# Patient Record
Sex: MALE | Race: ASIAN | NOT HISPANIC OR LATINO | URBAN - METROPOLITAN AREA
[De-identification: names, ages, dates, MRNs, and addresses within clinical notes are randomized per-mention and may not be internally consistent; named-entity substitution may affect disease eponyms.]

---

## 2019-08-23 ENCOUNTER — EMERGENCY (EMERGENCY)
Facility: HOSPITAL | Age: 59
LOS: 1 days | Discharge: HOME | End: 2019-08-23
Admitting: EMERGENCY MEDICINE
Payer: COMMERCIAL

## 2019-08-23 VITALS
DIASTOLIC BLOOD PRESSURE: 76 MMHG | HEART RATE: 87 BPM | SYSTOLIC BLOOD PRESSURE: 145 MMHG | TEMPERATURE: 98 F | RESPIRATION RATE: 18 BRPM | OXYGEN SATURATION: 100 %

## 2019-08-23 VITALS
SYSTOLIC BLOOD PRESSURE: 125 MMHG | DIASTOLIC BLOOD PRESSURE: 74 MMHG | TEMPERATURE: 98 F | RESPIRATION RATE: 18 BRPM | OXYGEN SATURATION: 100 % | HEART RATE: 72 BPM

## 2019-08-23 PROCEDURE — 73030 X-RAY EXAM OF SHOULDER: CPT | Mod: 26,LT

## 2019-08-23 PROCEDURE — 72100 X-RAY EXAM L-S SPINE 2/3 VWS: CPT | Mod: 26

## 2019-08-23 PROCEDURE — 72125 CT NECK SPINE W/O DYE: CPT | Mod: 26

## 2019-08-23 PROCEDURE — 73562 X-RAY EXAM OF KNEE 3: CPT | Mod: 26,50

## 2019-08-23 PROCEDURE — 73080 X-RAY EXAM OF ELBOW: CPT | Mod: 26,LT

## 2019-08-23 PROCEDURE — 71250 CT THORAX DX C-: CPT | Mod: 26

## 2019-08-23 PROCEDURE — 99284 EMERGENCY DEPT VISIT MOD MDM: CPT

## 2019-08-23 PROCEDURE — 72070 X-RAY EXAM THORAC SPINE 2VWS: CPT | Mod: 26

## 2019-08-23 PROCEDURE — 72170 X-RAY EXAM OF PELVIS: CPT | Mod: 26

## 2019-08-23 RX ORDER — OXYCODONE AND ACETAMINOPHEN 5; 325 MG/1; MG/1
1 TABLET ORAL ONCE
Refills: 0 | Status: DISCONTINUED | OUTPATIENT
Start: 2019-08-23 | End: 2019-08-23

## 2019-08-23 RX ORDER — IBUPROFEN 200 MG
800 TABLET ORAL ONCE
Refills: 0 | Status: COMPLETED | OUTPATIENT
Start: 2019-08-23 | End: 2019-08-23

## 2019-08-23 RX ORDER — ACETAMINOPHEN 500 MG
650 TABLET ORAL ONCE
Refills: 0 | Status: COMPLETED | OUTPATIENT
Start: 2019-08-23 | End: 2019-08-23

## 2019-08-23 RX ORDER — DIAZEPAM 5 MG
1 TABLET ORAL
Qty: 3 | Refills: 0
Start: 2019-08-23 | End: 2019-08-25

## 2019-08-23 RX ORDER — OXYCODONE HYDROCHLORIDE 5 MG/1
10 TABLET ORAL ONCE
Refills: 0 | Status: DISCONTINUED | OUTPATIENT
Start: 2019-08-23 | End: 2019-08-23

## 2019-08-23 RX ADMIN — Medication 800 MILLIGRAM(S): at 14:42

## 2019-08-23 RX ADMIN — OXYCODONE HYDROCHLORIDE 10 MILLIGRAM(S): 5 TABLET ORAL at 16:30

## 2019-08-23 RX ADMIN — OXYCODONE AND ACETAMINOPHEN 1 TABLET(S): 5; 325 TABLET ORAL at 14:42

## 2019-08-23 RX ADMIN — Medication 650 MILLIGRAM(S): at 14:42

## 2019-08-23 NOTE — ED PROVIDER NOTE - NSFOLLOWUPINSTRUCTIONS_ED_ALL_ED_FT
Follow up with your primary doctor and an orthopedist. If you do not have an orthopedist call  to schedule an appointment. Take Naproxen every 12 hours for pain and valium before bed, DO NOT combine naproxen with motrin or ibuprofen and DO NOT drive or use heavy machinery after taking valium. Advance activity as tolerated.  Continue all previously prescribed medications as directed.  Follow up with your primary care physician in 48-72 hours- bring copies of your results.  Return to the ER for worsening or persistent symptoms, and/or ANY NEW OR CONCERNING SYMPTOMS. If you have issues obtaining follow up, please call: [phone] to obtain a doctor or specialist who takes your insurance in your area.  You may call [phone] to make an appointment with the internal medicine clinic.

## 2019-08-23 NOTE — ED PROVIDER NOTE - PROGRESS NOTE DETAILS
JEROME Briceno: Pt reassessed and notes greatly improved pain, all imaging normal, suspect msk ediology/contusion, will give ortho F/U if pain persists. Dr. Rivera: As per hospital protocol, this patient was managed by the PA exclusively and I am signing as a matter of protocol.  I did not participate in the care of this patient, nor was I aware of the evaluation or plan until the time this chart was signed.
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
## 2019-08-23 NOTE — ED PROVIDER NOTE - CONSTITUTIONAL, MLM
normal... Uncomfortable, well nourished, awake, alert, oriented to person, place, time/situation and in no apparent distress.

## 2019-08-23 NOTE — ED PROVIDER NOTE - CRANIAL NERVE AND PUPILLARY EXAM
5/5 strength and intact sensation bilaterally in upper and lower extremity/cranial nerves 2-12 intact

## 2019-10-14 ENCOUNTER — APPOINTMENT (OUTPATIENT)
Dept: MRI IMAGING | Facility: IMAGING CENTER | Age: 59
End: 2019-10-14
Payer: COMMERCIAL

## 2019-10-14 ENCOUNTER — OUTPATIENT (OUTPATIENT)
Dept: OUTPATIENT SERVICES | Facility: HOSPITAL | Age: 59
LOS: 1 days | End: 2019-10-14
Payer: COMMERCIAL

## 2019-10-14 DIAGNOSIS — Z00.8 ENCOUNTER FOR OTHER GENERAL EXAMINATION: ICD-10-CM

## 2019-10-14 PROCEDURE — 72148 MRI LUMBAR SPINE W/O DYE: CPT | Mod: 26

## 2019-10-14 PROCEDURE — 72148 MRI LUMBAR SPINE W/O DYE: CPT

## 2025-01-23 ENCOUNTER — EMERGENCY (EMERGENCY)
Facility: HOSPITAL | Age: 65
LOS: 1 days | Discharge: HOME | End: 2025-01-23
Attending: EMERGENCY MEDICINE
Payer: MEDICARE

## 2025-01-23 VITALS — OXYGEN SATURATION: 97 % | HEART RATE: 87 BPM | DIASTOLIC BLOOD PRESSURE: 76 MMHG | SYSTOLIC BLOOD PRESSURE: 159 MMHG

## 2025-01-23 VITALS
DIASTOLIC BLOOD PRESSURE: 74 MMHG | RESPIRATION RATE: 16 BRPM | SYSTOLIC BLOOD PRESSURE: 160 MMHG | TEMPERATURE: 98 F | WEIGHT: 149.91 LBS | HEIGHT: 65 IN | OXYGEN SATURATION: 96 % | HEART RATE: 88 BPM

## 2025-01-23 PROCEDURE — 71100 X-RAY EXAM RIBS UNI 2 VIEWS: CPT | Mod: 26

## 2025-01-23 PROCEDURE — 71046 X-RAY EXAM CHEST 2 VIEWS: CPT

## 2025-01-23 PROCEDURE — 99284 EMERGENCY DEPT VISIT MOD MDM: CPT

## 2025-01-23 PROCEDURE — 71046 X-RAY EXAM CHEST 2 VIEWS: CPT | Mod: 26

## 2025-01-23 PROCEDURE — 73080 X-RAY EXAM OF ELBOW: CPT

## 2025-01-23 PROCEDURE — 71100 X-RAY EXAM RIBS UNI 2 VIEWS: CPT

## 2025-01-23 PROCEDURE — 99284 EMERGENCY DEPT VISIT MOD MDM: CPT | Mod: 25

## 2025-01-23 PROCEDURE — 73080 X-RAY EXAM OF ELBOW: CPT | Mod: 26,LT

## 2025-01-23 RX ORDER — LIDOCAINE 50 MG/G
1 OINTMENT TOPICAL ONCE
Refills: 0 | Status: COMPLETED | OUTPATIENT
Start: 2025-01-23 | End: 2025-01-23

## 2025-01-23 RX ORDER — IBUPROFEN 200 MG
400 TABLET ORAL ONCE
Refills: 0 | Status: COMPLETED | OUTPATIENT
Start: 2025-01-23 | End: 2025-01-23

## 2025-01-23 RX ORDER — ACETAMINOPHEN 80 MG/.8ML
975 SOLUTION/ DROPS ORAL ONCE
Refills: 0 | Status: COMPLETED | OUTPATIENT
Start: 2025-01-23 | End: 2025-01-23

## 2025-01-23 RX ADMIN — LIDOCAINE 1 PATCH: 50 OINTMENT TOPICAL at 17:49

## 2025-01-23 RX ADMIN — ACETAMINOPHEN 975 MILLIGRAM(S): 80 SOLUTION/ DROPS ORAL at 17:48

## 2025-01-23 RX ADMIN — Medication 400 MILLIGRAM(S): at 17:48

## 2025-01-23 NOTE — ED PROVIDER NOTE - OBJECTIVE STATEMENT
01-Jun-2024 22:17
64-year-old male with past medical history of hypertension, presenting after a fall.  Patient states that he slipped on the last step because of his AC.  Patient fell backwards onto concrete.  Patient reporting right-sided posterior lower chest/abdominal pain.  Denies head trauma, LOC, nausea, vomiting, numbness/weakness of the arms/legs, saddle anesthesia.  Patient is not taking anything for the pain.  Denies blood thinners.

## 2025-01-23 NOTE — ED PROVIDER NOTE - NSFOLLOWUPINSTRUCTIONS_ED_ALL_ED_FT
You were seen in the Emergency Department for back pain and elbow pain.  Received x-rays to evaluate for rib fractures or out fracture of your elbow.  These x-rays did not find an acute fracture.    To control your pain at home, you should take Ibuprofen 400 mg along with Tylenol 650mg-1000mg every 6 to 8 hours. Limit your maximum daily Tylenol from all sources to 4000mg. Be aware that many other medications contain acetaminophen which is also known as Tylenol. Taking Tylenol and Ibuprofen together has been shown to be more effective at relieving pain than taking them separately. These are both over the counter medications that you can  at your local pharmacy without a prescription. You need to respect all of the warnings on the bottles. You shouldn’t take these medications for more than a week without following up with your doctor. Both medications come with certain risks and side effects that you need to discuss with your doctor, especially if you are taking them for a prolonged period.     1) Advance activity as tolerated.   2) Continue all previously prescribed medications as directed.    3) Follow up with your primary care physician within the week- take copies of your results.    4) Return to the Emergency Department for worsening or persistent symptoms, and/or ANY NEW OR CONCERNING SYMPTOMS.

## 2025-01-23 NOTE — ED ADULT NURSE NOTE - OBJECTIVE STATEMENT
64 yr old male with h/o high bp was in his back garden when he slippe and fell onto his back. was able to get up and walk but has some mid back pain. on assessment and o x 3 lungs clear abd soft non tender no swelling in extremities no n/v/d no fever no other complaints no loc no head strike.
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
## 2025-01-23 NOTE — ED PROVIDER NOTE - WR ORDER NAME 3
Abdomen soft, nontender, nondistended, bowel sounds present in all 4 quadrants.
Xray Elbow AP + Lateral + Oblique, Left

## 2025-01-23 NOTE — ED PROVIDER NOTE - CLINICAL SUMMARY MEDICAL DECISION MAKING FREE TEXT BOX
64-year-old male with past medical history of hypertension, presenting after a fall. Vitals nonactionable.  Physical exam with heart regular rate and rhythm, lungs clear to auscultation, abdomen soft nondistended and tender..  Patient with lower right-sided chest wall tenderness.  Concern for possible rib fractures.  Will obtain x-rays and administer pain medication.

## 2025-01-23 NOTE — ED PROVIDER NOTE - PROGRESS NOTE DETAILS
MD Juli (PGY-3) patient reassessed.  Patient reports improvement in back pain.  Patient x-rays reviewed.  No acute fractures.  Patient now reporting elbow pain of the left side for the past month.  Full range of motion of left elbow.  Mild tenderness over lateral epicondyle.  Will obtain x-ray of elbow.  Patient able to stably ambulate in the ED.  Likely dispo home. MD Juli (PGY-3) x-ray elbow performed.  No acute findings.  Will have patient follow-up with PCP concerning his pain.  Discussed with pt results of work up, strict return precautions, and need for follow up.  Pt expressed understanding and agrees with plan.

## 2025-01-23 NOTE — ED PROVIDER NOTE - PHYSICAL EXAMINATION
Const: not in acute distress  Eyes: no conjunctival injection  HEENT: Head NCAT, Moist MM.  Neck: Trachea midline.   CVS: +S1/S2, No murmurs or gallops  RESP: Unlabored respiratory effort. Clear to auscultation bilaterally.  Lower right-sided chest wall tenderness.  No ecchymosis noted.  GI: Nontender/Nondistended, No CVA tenderness b/l.   MSK: No midline spinal tenderness.  Skin: Intact.   Neuro: CN2-12 grossly intact. EOMI. 5/5 strength in UE and LE b/l.  Sensation intact in UE/LE b/l b/l.  Gait nl   Psych: Awake, Alert, & Cooperative

## 2025-01-23 NOTE — ED PROVIDER NOTE - PATIENT PORTAL LINK FT
You can access the FollowMyHealth Patient Portal offered by [facility] by registering at the following website: [email or web address]. By joining [facility]’s FollowMyHealth portal, you will also be able to view your health information using other applications (apps) compatible with our system.

## 2025-03-30 ENCOUNTER — EMERGENCY (EMERGENCY)
Facility: HOSPITAL | Age: 65
LOS: 1 days | Discharge: HOME | End: 2025-03-30
Attending: EMERGENCY MEDICINE | Admitting: STUDENT IN AN ORGANIZED HEALTH CARE EDUCATION/TRAINING PROGRAM
Payer: MEDICAID

## 2025-03-30 VITALS
SYSTOLIC BLOOD PRESSURE: 125 MMHG | WEIGHT: 149.91 LBS | OXYGEN SATURATION: 100 % | HEIGHT: 65 IN | HEART RATE: 64 BPM | RESPIRATION RATE: 16 BRPM | TEMPERATURE: 97 F | DIASTOLIC BLOOD PRESSURE: 83 MMHG

## 2025-03-30 VITALS
TEMPERATURE: 98 F | DIASTOLIC BLOOD PRESSURE: 74 MMHG | SYSTOLIC BLOOD PRESSURE: 127 MMHG | RESPIRATION RATE: 18 BRPM | HEART RATE: 78 BPM | OXYGEN SATURATION: 96 %

## 2025-03-30 DIAGNOSIS — I67.1 CEREBRAL ANEURYSM, NONRUPTURED: ICD-10-CM

## 2025-03-30 LAB
ADD ON TEST-SPECIMEN IN LAB: SIGNIFICANT CHANGE UP
ALBUMIN SERPL ELPH-MCNC: 4.2 G/DL — SIGNIFICANT CHANGE UP (ref 3.3–5)
ALP SERPL-CCNC: 107 U/L — SIGNIFICANT CHANGE UP (ref 40–120)
ALT FLD-CCNC: 13 U/L — SIGNIFICANT CHANGE UP (ref 4–41)
ANION GAP SERPL CALC-SCNC: 14 MMOL/L — SIGNIFICANT CHANGE UP (ref 7–14)
APTT BLD: 26.6 SEC — SIGNIFICANT CHANGE UP (ref 24.5–35.6)
AST SERPL-CCNC: 15 U/L — SIGNIFICANT CHANGE UP (ref 4–40)
BASOPHILS # BLD AUTO: 0.05 K/UL — SIGNIFICANT CHANGE UP (ref 0–0.2)
BASOPHILS NFR BLD AUTO: 0.6 % — SIGNIFICANT CHANGE UP (ref 0–2)
BILIRUB SERPL-MCNC: <0.2 MG/DL — SIGNIFICANT CHANGE UP (ref 0.2–1.2)
BUN SERPL-MCNC: 12 MG/DL — SIGNIFICANT CHANGE UP (ref 7–23)
CALCIUM SERPL-MCNC: 9.1 MG/DL — SIGNIFICANT CHANGE UP (ref 8.4–10.5)
CHLORIDE SERPL-SCNC: 105 MMOL/L — SIGNIFICANT CHANGE UP (ref 98–107)
CK MB BLD-MCNC: 1.9 % — SIGNIFICANT CHANGE UP (ref 0–2.5)
CK MB CFR SERPL CALC: 1.4 NG/ML — SIGNIFICANT CHANGE UP
CK SERPL-CCNC: 75 U/L — SIGNIFICANT CHANGE UP (ref 30–200)
CO2 SERPL-SCNC: 20 MMOL/L — LOW (ref 22–31)
CREAT SERPL-MCNC: 0.78 MG/DL — SIGNIFICANT CHANGE UP (ref 0.5–1.3)
EGFR: 98 ML/MIN/1.73M2 — SIGNIFICANT CHANGE UP
EGFR: 98 ML/MIN/1.73M2 — SIGNIFICANT CHANGE UP
EOSINOPHIL # BLD AUTO: 0.42 K/UL — SIGNIFICANT CHANGE UP (ref 0–0.5)
EOSINOPHIL NFR BLD AUTO: 4.7 % — SIGNIFICANT CHANGE UP (ref 0–6)
GLUCOSE SERPL-MCNC: 138 MG/DL — HIGH (ref 70–99)
HCT VFR BLD CALC: 39.6 % — SIGNIFICANT CHANGE UP (ref 39–50)
HGB BLD-MCNC: 13.5 G/DL — SIGNIFICANT CHANGE UP (ref 13–17)
IANC: 4.42 K/UL — SIGNIFICANT CHANGE UP (ref 1.8–7.4)
IMM GRANULOCYTES NFR BLD AUTO: 0.3 % — SIGNIFICANT CHANGE UP (ref 0–0.9)
INR BLD: 1.05 RATIO — SIGNIFICANT CHANGE UP (ref 0.85–1.16)
LYMPHOCYTES # BLD AUTO: 3.14 K/UL — SIGNIFICANT CHANGE UP (ref 1–3.3)
LYMPHOCYTES # BLD AUTO: 35.5 % — SIGNIFICANT CHANGE UP (ref 13–44)
MCHC RBC-ENTMCNC: 28.8 PG — SIGNIFICANT CHANGE UP (ref 27–34)
MCHC RBC-ENTMCNC: 34.1 G/DL — SIGNIFICANT CHANGE UP (ref 32–36)
MCV RBC AUTO: 84.6 FL — SIGNIFICANT CHANGE UP (ref 80–100)
MONOCYTES # BLD AUTO: 0.79 K/UL — SIGNIFICANT CHANGE UP (ref 0–0.9)
MONOCYTES NFR BLD AUTO: 8.9 % — SIGNIFICANT CHANGE UP (ref 2–14)
NEUTROPHILS # BLD AUTO: 4.42 K/UL — SIGNIFICANT CHANGE UP (ref 1.8–7.4)
NEUTROPHILS NFR BLD AUTO: 50 % — SIGNIFICANT CHANGE UP (ref 43–77)
NRBC # BLD AUTO: 0 K/UL — SIGNIFICANT CHANGE UP (ref 0–0)
NRBC # FLD: 0 K/UL — SIGNIFICANT CHANGE UP (ref 0–0)
NRBC BLD AUTO-RTO: 0 /100 WBCS — SIGNIFICANT CHANGE UP (ref 0–0)
PLATELET # BLD AUTO: 406 K/UL — HIGH (ref 150–400)
POTASSIUM SERPL-MCNC: 3.4 MMOL/L — LOW (ref 3.5–5.3)
POTASSIUM SERPL-SCNC: 3.4 MMOL/L — LOW (ref 3.5–5.3)
PROT SERPL-MCNC: 6.9 G/DL — SIGNIFICANT CHANGE UP (ref 6–8.3)
PROTHROM AB SERPL-ACNC: 12.5 SEC — SIGNIFICANT CHANGE UP (ref 9.9–13.4)
RBC # BLD: 4.68 M/UL — SIGNIFICANT CHANGE UP (ref 4.2–5.8)
RBC # FLD: 13.9 % — SIGNIFICANT CHANGE UP (ref 10.3–14.5)
SODIUM SERPL-SCNC: 139 MMOL/L — SIGNIFICANT CHANGE UP (ref 135–145)
TROPONIN T, HIGH SENSITIVITY RESULT: 7 NG/L — SIGNIFICANT CHANGE UP
WBC # BLD: 8.85 K/UL — SIGNIFICANT CHANGE UP (ref 3.8–10.5)
WBC # FLD AUTO: 8.85 K/UL — SIGNIFICANT CHANGE UP (ref 3.8–10.5)

## 2025-03-30 PROCEDURE — 0042T: CPT

## 2025-03-30 PROCEDURE — 70551 MRI BRAIN STEM W/O DYE: CPT | Mod: 26

## 2025-03-30 PROCEDURE — 70496 CT ANGIOGRAPHY HEAD: CPT | Mod: 26

## 2025-03-30 PROCEDURE — 93010 ELECTROCARDIOGRAM REPORT: CPT

## 2025-03-30 PROCEDURE — 93306 TTE W/DOPPLER COMPLETE: CPT | Mod: 26

## 2025-03-30 PROCEDURE — 99283 EMERGENCY DEPT VISIT LOW MDM: CPT

## 2025-03-30 PROCEDURE — 99236 HOSP IP/OBS SAME DATE HI 85: CPT

## 2025-03-30 PROCEDURE — 70498 CT ANGIOGRAPHY NECK: CPT | Mod: 26

## 2025-03-30 PROCEDURE — 70450 CT HEAD/BRAIN W/O DYE: CPT | Mod: 26,XU

## 2025-03-30 RX ORDER — ONDANSETRON HCL/PF 4 MG/2 ML
4 VIAL (ML) INJECTION ONCE
Refills: 0 | Status: COMPLETED | OUTPATIENT
Start: 2025-03-30 | End: 2025-03-30

## 2025-03-30 RX ORDER — DIAZEPAM 2 MG/1
5 TABLET ORAL ONCE
Refills: 0 | Status: DISCONTINUED | OUTPATIENT
Start: 2025-03-30 | End: 2025-03-30

## 2025-03-30 RX ORDER — ASPIRIN 325 MG
324 TABLET ORAL ONCE
Refills: 0 | Status: COMPLETED | OUTPATIENT
Start: 2025-03-30 | End: 2025-03-30

## 2025-03-30 RX ORDER — MECLIZINE HCL 12.5 MG
1 TABLET ORAL
Qty: 30 | Refills: 0
Start: 2025-03-30 | End: 2025-04-08

## 2025-03-30 RX ORDER — DIAZEPAM 2 MG/1
2.5 TABLET ORAL ONCE
Refills: 0 | Status: DISCONTINUED | OUTPATIENT
Start: 2025-03-30 | End: 2025-03-30

## 2025-03-30 RX ADMIN — Medication 324 MILLIGRAM(S): at 08:22

## 2025-03-30 RX ADMIN — Medication 40 MILLIEQUIVALENT(S): at 21:19

## 2025-03-30 RX ADMIN — DIAZEPAM 2.5 MILLIGRAM(S): 2 TABLET ORAL at 06:26

## 2025-03-30 RX ADMIN — Medication 4 MILLIGRAM(S): at 06:21

## 2025-03-30 NOTE — ED ADULT NURSE REASSESSMENT NOTE - NS ED NURSE REASSESS COMMENT FT1
JESSIE RN: Pt received to CT scan from spot 22a. Code stroke called by MD Edmond. Pt A&Ox4, ambulatory at baseline, presenting to the ED today after waking up at 4 am with sudden dizziness and n/v. Pt states the symptoms were not present before going to sleep, LKW 11pm. Pt denies c/p, SOB, headache, blurry vision, diarrhea, abd pain, or fever like symptoms. NIH as documented. 20G IV placed in the left wrist. Labs drawn and sent. Pending results of CT. Respirations even and unlabored. Report given to primary RN Eder. JESSIE RN: Pt received to CT scan from spot 22a. Code stroke called by MD Edmond. Pt A&Ox4, ambulatory at baseline, presenting to the ED today after waking up at 4 am with sudden dizziness and n/v. Pt states the symptoms were not present before going to sleep, LKW 11pm. Pt denies c/p, SOB, headache, blurry vision, diarrhea, abd pain, or fever like symptoms. NIH as documented. 20G IV placed in the left wrist and 20G IV placed in the right AC. Labs drawn and sent. Pending results of CT. Respirations even and unlabored. Report given to primary ASHLIE Gaines.

## 2025-03-30 NOTE — CONSULT NOTE ADULT - SUBJECTIVE AND OBJECTIVE BOX
64-year-old Gurwinder-speaking male with PMHx, HTN, pre-DM who presented to the ED as code stroke for dizziness. [facility]  Fay ID#[id number] assisted with translation. Patient went to bed on 3/29 at approximately 2300 in normal state of health. He woke up this morning around 0400 and immediately felt a severe room-spinning sensation, associated with nausea. Patient managed to walk to the bathroom where he continued to experience dry heaving. Returned to bed and lay down with minimal improvement of his symptoms, called his wife who brought him to the ED. Patient denies headache, focal motor or sensory loss, visual disturbances. Dizziness and nausea have resolved at the time of this consultation.    WDWN male in NAD  Vital Signs Last 24 Hrs  T(C): 36.3 (30 Mar 2025 17:34), Max: 36.7 (30 Mar 2025 11:27)  T(F): 97.4 (30 Mar 2025 17:34), Max: 98 (30 Mar 2025 11:27)  HR: 57 (30 Mar 2025 17:34) (57 - 77)  BP: 123/76 (30 Mar 2025 17:34) (117/64 - 131/91)  BP(mean): --  RR: 20 (30 Mar 2025 17:34) (13 - 20)  SpO2: 98% (30 Mar 2025 17:34) (97% - 100%)    Parameters below as of 30 Mar 2025 17:34  Patient On (Oxygen Delivery Method): room air    AAO X 3  PERRLA, EOMI  CN 2-12 grossly intact  JENKINS strength 5/5  SILT  No meningismus or nucchal rigidity    < from: CT Angio Brain Stroke Protocol  w/ IV Cont (03.30.25 @ 06:12) >  CT PERFUSION:  No convincing evidence of acute core infarct or ischemic penumbra. MRI   brain recommended if symptoms persist.    CTA NECK:  No evidence of significant stenosis or occlusion.    CTA HEAD:  No large vessel occlusion or hemodynamically flow-limiting stenosis   identified.    2 mm anteriorly projecting saccular aneurysm at the junction of the   anterior communicating artery and right A2 segment origin.    < end of copied text >    < from: CT Brain Stroke Protocol (03.30.25 @ 05:52) >  VENTRICLES AND SULCI: Atrophy out of proportion to patient's age.  INTRA-AXIAL: No mass effect, acute hemorrhage, or midline shift.  There   are periventricular and subcortical white matter hypodensities,   consistent with microvascular type changes.  EXTRA-AXIAL: No mass or fluid collection. Basal cisterns are normal in   appearance.    VISUALIZED SINUSES:  Clear.  TYMPANOMASTOID CAVITIES:  Clear.  VISUALIZED ORBITS: Normal.  CALVARIUM: Intact.    MISCELLANEOUS: None.      IMPRESSION:  No acute intracranial hemorrhage, mass effect, ormidline shift.    Periventricular and subcortical white matter hypodensities, consistent   with microvascular type changes.    < end of copied text >    < from: MR Head No Cont (03.30.25 @ 14:19) >  HEMISPHERES: There are scattered small white matter lesions which may  represent a mild chronic ischemic change. No diffusion restriction or MR   evidence of acute ischemia is noted. No hemorrhagic lesion or mass is   identified. There is mild generalized volume loss.  VENTRICLES:  Midline with ex vacuo enlargement  POSTERIOR FOSSA:  The brain stem and cerebellum are unremarkable in   appearance.  No CP angle abnormality is noted.  EXTRA-AXIAL:  No mass or collections are depicted.  VASCULATURE:  The major vessels and venous sinuses are grossly patent.  SINUSES AND MASTOIDS:  Clear.  MISCELLANEOUS:  No orbital or pituitary abnormality noted.  No skull base   lesion suggested.      IMPRESSION:    1)  Scattered white matter lesions which likely reflect mild chronic   ischemic change. No acute infarct, hemorrhage, or space-occupying lesion   identified.  2)  no posterior fossa abnormality noted. No CP angle or IAC lesion   suggested. Clear sinuses and mastoids.    < end of copied text >

## 2025-03-30 NOTE — ED ADULT NURSE REASSESSMENT NOTE - NS ED NURSE REASSESS COMMENT FT1
pt requesting food, cleared for po intake as per orders. provided with vegetarian lunch tray, tolerated well at this time. pt able to change into gown independently, all belongings placed in bag. pt to MRI via wheelchair in NAD, RR even and unlabored, safety measures maintained

## 2025-03-30 NOTE — ED ADULT NURSE REASSESSMENT NOTE - NS ED NURSE REASSESS COMMENT FT1
report given to Gillian CDU RN, all questions answered. pt belongings moved to CDU. MRI called and made aware to transport pt to CDU 9 after MRI completed

## 2025-03-30 NOTE — CONSULT NOTE ADULT - ASSESSMENT
Assessment: 64M Gurwinder speaking PMHx HTN, pre-DM who presented as code stroke for dizziness. Went to bed in usual state of health 3/29 @2300, woke up on 3/30 around 0400 and suddenly experienced room spinning dizziness and nausea. Was able to ambulate to the bathroom despite symptoms. Subsequently experienced some improved of dizziness but continued to experience nausea and brought to the ED. Initial VS in ED: /83. Exam: No focal deficits, no dysmetria, dysarthria. HINTS exam not contributory with no nystagmus, skew deviation, or corrective saccade. CT head no acute pathology. 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 CT perfusion no deficit.     pre-mRS: 0  LKN: 3/29 @2300  NIHSS: 0    Not a tenecteplase candidate due to out of window.  Not a mechanical thrombectomy candidate due to no LVO.    Impression: Acute onset room spinning dizziness and nausea, likely peripheral in etiology given positional component, patient reports vertigo symptoms are improving but continues to experience nausea at this time. No other features to suggest central etiology.    Recommendations:  [] BP measurements in both arms + orthostatic VS  [] Lipid panel, HbA1c  [] Meclizine 25mg BID PRN (can increase to 50mg Q8)  [] Ondansetron 8mg up to q8hr PRN OR Reglan 4mg PRN q8h PRN for N/V  [] Can try Clonazepam 0.5mg now then Q8H PRN (for 2 to 3 days)  [] Refer for Vestibular Rehab on discharge  [] Neurosurgery inpatient vs outpatient regarding 2 mm KARTHIK aneurysm    ***If no improvement of symptoms after medications, would admit to CDU and recommend the following  [] Load aspirin 325 mg and continue 81 mg daily  [] Atorvastatin 80mg (titrate to LDL < 70)   [] MRI brain without contrast   [] TTE without bubble   [] Keep BP permissive up to 220/110 for 48 hours followed by gradual normotension over 2-3 days   [] Telemonitoring    Discussed with telestroke attending Dr. Monae. To be formally staffed with attending on AM rounds. Case and plan not final until Attending attestation.
65 YO male presenting with dizziness and nausea which have now resolved. CTA reveals a 2mm saccular aneurysm at the junction of the anterior communicating artery and right A2 segment origin. No urgent neurosurgical intervention required at this time.

## 2025-03-30 NOTE — ED CDU PROVIDER INITIAL DAY NOTE - OBJECTIVE STATEMENT
64-year-old Gurwinder-speaking male with PMHx, HTN, pre-DM who presented to the ED as code stroke for dizziness. Gurwinder  Fay ID#[id number] assisted with translation. Patient went to bed on 3/29 at approximately 2300 in normal state of health. He woke up this morning around 0400 and immediately felt a severe room-spinning sensation, associated with nausea. Patient managed to walk to the bathroom where he continued to experience dry heaving. Returned to bed and lay down with minimal improvement of his symptoms, called his wife who brought him to the ED. Initial BP was 125/83, .   Upon neurology evaluation, patient reports that he continues to experience significant nausea and dry heaving. He endorses mild room spinning dizziness which is significantly improved since initial onset. He endorses slight worsening of his dizziness with sitting up from a supine position. He states that he is unable to walk at this time due to his nausea. Denies headache, blurry vision, weakness, numbness. Denies recent fever, chills, diarrhea, shortness of breath, tinnitus. Patient denies history of vertigo. He does not take antiplatelet/anticoagulating agents.   CDU PA: Agree with above.  In ED pt had 64-year-old Gurwinder-speaking male with PMHx, HTN, pre-DM who presented to the ED as code stroke for dizziness. Gurwinder  Fay ID#[id number] assisted with translation. Patient went to bed on 3/29 at approximately 2300 in normal state of health. He woke up this morning around 0400 and immediately felt a severe room-spinning sensation, associated with nausea. Patient managed to walk to the bathroom where he continued to experience dry heaving. Returned to bed and lay down with minimal improvement of his symptoms, called his wife who brought him to the ED. Initial BP was 125/83, .   Upon neurology evaluation, patient reports that he continues to experience significant nausea and dry heaving. He endorses mild room spinning dizziness which is significantly improved since initial onset. He endorses slight worsening of his dizziness with sitting up from a supine position. He states that he is unable to walk at this time due to his nausea. Denies headache, blurry vision, weakness, numbness. Denies recent fever, chills, diarrhea, shortness of breath, tinnitus. Patient denies history of vertigo. He does not take antiplatelet/anticoagulating agents.   CDU PA: Agree with above.  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 CT perfusion no deficit.   Per neuro- likely peripheral in etiology given positional component, patient reports vertigo symptoms are improving but continues to experience nausea at this time. No other features to suggest central etiology.  Recommended MRI head if patient still symptomatic.  Sent to CDU for MRI, tele monitoring, echo.   Currently pt feeling better.

## 2025-03-30 NOTE — ED ADULT TRIAGE NOTE - CHIEF COMPLAINT QUOTE
c/o dizziness "room spinning sensation", n/v since 4am that woke him up. LKW 2330. Denies abd pain, headache, chest pain, SOB, fevers/chills. no neuro deficits.  hx of HTN, HLD. MD Bullock called for Stroke evaluation

## 2025-03-30 NOTE — CONSULT NOTE ADULT - CRITICAL CARE ATTENDING COMMENT
Briefly this is a 64m with vascular risk factors who presented with sudden onset of dizziness and nausea.   Stroke code called on arrival  CTH neg, CTA no significant stenosis but does note 2mm saccular KARTHIK aneurysm  Out of tnk window  Currently symptomatically improved, no focality on exam  MRI brain with chronic ischemic changes, no evidence of acute infarct  would tx symptomatically as above  outpatient f/u for vestibular therapy

## 2025-03-30 NOTE — ED PROVIDER NOTE - ATTENDING CONTRIBUTION TO CARE
DR. FREEMAN, ATTENDING MD-  I performed a face to face bedside interview with the patient regarding history of present illness, review of symptoms and past medical history. I completed an independent physical exam.  I have discussed the patient's plan of care with the resident.   Documentation as above in the note.    65 y/o male h/o htn hld with vertigo n/v on waking, lkw 11:30pm.  Denies facial droop, slurred speech, numbness tingling weakness.  Code cva activated as stroke sx <24 hours as per ED protocol.  Obtain ekg cbc cmp trop ct brain cta h/n f/u neuro recs give medication to tx vertigo and nausea.

## 2025-03-30 NOTE — ED CDU PROVIDER DISPOSITION NOTE - NSFOLLOWUPINSTRUCTIONS_ED_ALL_ED_FT
Rest, drink plenty of fluids.  Advance activity as tolerated.  Continue all previously prescribed medications as directed.  Follow up with your primary care physician in 48-72 hours- bring copies of your results.  Return to the ER for worsening or persistent symptoms, and/or ANY NEW OR CONCERNING SYMPTOMS. If you have issues obtaining follow up, please call: [phone] to obtain a doctor or specialist who takes your insurance in your area.  You may call [phone] to make an appointment with the internal medicine clinic. Follow up with Dr. Peraza who is a neurosurgeon. You have an aneurysm which is a weakening of your blood vessel in your brain, you must follow up with the specialist to ensure it is not at risk for rupturing which could cause a stroke which could be fatal. He is planning to see you on 4/18/25 but call his office to confirm the appointment. Take Meclizine every 6 hours as needed for dizziness. This medication can make you drowsy, do not drive or drink alcohol with use. You can see Dr. Heidi Torres who is a stroke neurologist at  located at [address] [facility] [address]. Advance activity as tolerated.  Continue all previously prescribed medications as directed.  Follow up with your primary care physician in 48-72 hours- bring copies of your results.  Return to the ER for worsening or persistent symptoms, and/or ANY NEW OR CONCERNING SYMPTOMS. THIS INCLUDES BUT IS NOT LIMITED TO A SUDDEN OR SEVERE HEADACHE, NUMBNESS, WEAKNESS, LOSS OR CHANGE OF VISIf you have issues obtaining follow up, please call: [phone] to obtain a doctor or specialist who takes your insurance in your area.  You may call [phone] to make an appointment with the internal medicine clinic.

## 2025-03-30 NOTE — ED CDU PROVIDER INITIAL DAY NOTE - PROGRESS NOTE DETAILS
Received signout on the patient, pt is feeling better, aneurysm seen by neurosurgery, planning outpatient follow up. Spoke to the patient with Gurwinder  [phone].

## 2025-03-30 NOTE — ED ADULT NURSE REASSESSMENT NOTE - NS ED NURSE REASSESS COMMENT FT1
Break RN: Report received from RN Maria De Jesus. Pt received to CDU9 from MRI. pt a&ox4, denying chest pain, sob, n+v, headache, dizziness, vision changes. RR even, unlabored. PERRLA. No neuro deficits present. pt ambulatory with steady gait. Safety maintained.

## 2025-03-30 NOTE — ED ADULT NURSE NOTE - NSFALLRISKINTERV_ED_ALL_ED

## 2025-03-30 NOTE — ED CDU PROVIDER DISPOSITION NOTE - PATIENT PORTAL LINK FT
You can access the FollowMyHealth Patient Portal offered by [facility] by registering at the following website: [email or web address]. By joining [facility]’s FollowMyHealth portal, you will also be able to view your health information using other applications (apps) compatible with our system.

## 2025-03-30 NOTE — ED ADULT NURSE REASSESSMENT NOTE - NS ED NURSE REASSESS COMMENT FT1
Report received from night RN. Pt A&Ox4, ambulatory. Respirations equal and unlabored. NSR on cardiac monitor. Pt denies any current complaints. Plan for CDU admission and MRI. 20G IV to L wrist patent, flushes well. No acute distress noted. Safety maintained.

## 2025-03-30 NOTE — ED CDU PROVIDER INITIAL DAY NOTE - ATTENDING APP SHARED VISIT CONTRIBUTION OF CARE
CDU MD FREEMAN:  I performed a face to face bedside interview with patient regarding history of present illness, review of symptoms and past medical history. I completed an independent physical exam.  I have discussed patient's plan of care with PA.   I agree with note as stated above, having amended the EMR as needed to reflect my findings. I have discussed the assessment and plan of care.  This includes during the time I functioned as the attending physician for this patient.    63 y/o male h/o htn with vertigo.  Code cva in ED.  CDU for MRI, neuro following.

## 2025-03-30 NOTE — ED ADULT NURSE REASSESSMENT NOTE - NS ED NURSE REASSESS COMMENT FT1
report received from previous ED RN. pt returned from echo via wheelchair in NAD, A&OX4, RR even and unlabored. pt able to walk to bathroom with steady gait, safely returned back to stretcher. reconnected to cardiac monitor, NSR at this time. safety measures maintained, side rails up x2. awaiting CDU bed placement

## 2025-03-30 NOTE — ED CDU PROVIDER DISPOSITION NOTE - CLINICAL COURSE
65 yo M with hx HTN presenting with vertigo a/w N/V after waking from sleep around 4AM. Unclear if woke him from sleep or he was awake and then it started. LKW 1130PM. No prior episodes. States not triggered by head movement and has been persistent. No headache, weakness, tingling, dysphagia, dysarthia, diplopia, chest pain, SOB, abdominal pain, recent illness, fever, urinary symptoms, diarrhea. Nonsmoker. NKDA. While in CDU MRI studies were obtained which are neg for a central etiology of the pt's sx. Pt has no LEVIN or other sx and has been ambulatory. Pt seen and cleared by neurosurgery in light of an incidental 2MM aneurysm. Neurology recs appreciated, d/w night neurology resident to ensure there are no additional recs. Will D/C with outpatient follow up, PRN meclizine, return precautions.

## 2025-03-30 NOTE — ED PROVIDER NOTE - CLINICAL SUMMARY MEDICAL DECISION MAKING FREE TEXT BOX
63 yo M with hx HTN presenting with vertigo after waking from sleep around 4AM. Unclear if woke him from sleep or he was awake and then it started. LKW 1130PM. No prior episodes. States not triggered by head movement and has been persistent. 65 yo M with hx HTN presenting with vertigo a/w N/V after waking from sleep around 4AM. Unclear if woke him from sleep or he was awake and then it started. LKW 1130PM. No prior episodes. States not triggered by head movement and has been persistent. No chest pain, SOB, abdominal pain, recent illness, fever, urinary symptoms, diarrhea. Nonsmoker. NKDA.    Vitals nonactionable.     GENERAL: Appears nauseated, NAD  HEAD: NC/AT, neck supple, moist mucous membranes  EYES: PERRL, EOM grossly intact, sclera anicteric. No nystagmus.  LUNGS: normal WOB on RA, CTAB, no wheezes or crackles   CARDIAC: RRR, no m/r/g  ABDOMEN: Soft, non tender, non distended, no rebound, no guarding  EXT: No edema,  no deformities.  NEURO: A&Ox3. Moving all extremities. 5/5 strength in upper and lower extremities bilaterally, no dysmetria, no pronator drift, cranial nerves 2-12 grossly intact, sensation intact to light touch bilaterally   SKIN: Warm and dry. No rash.  PSYCH: Normal affect.    Persistent vertigo with N/V awoken from sleep at 4AM, LKW 11PM. Non focal neurological exam with no nystagmus, unable to perform HINTs. Given persistent dizziness iso risk factors code stroke called for CT evaluation. 63 yo M with hx HTN presenting with vertigo a/w N/V after waking from sleep around 4AM. Unclear if woke him from sleep or he was awake and then it started. LKW 1130PM. No prior episodes. States not triggered by head movement and has been persistent. No dysphagia, dysarthia, diplopia, chest pain, SOB, abdominal pain, recent illness, fever, urinary symptoms, diarrhea. Nonsmoker. NKDA.    Vitals nonactionable.     GENERAL: Appears nauseated, NAD  HEAD: NC/AT, neck supple, moist mucous membranes  EYES: PERRL, EOM grossly intact, sclera anicteric. No nystagmus.  LUNGS: normal WOB on RA, CTAB, no wheezes or crackles   CARDIAC: RRR, no m/r/g  ABDOMEN: Soft, non tender, non distended, no rebound, no guarding  EXT: No edema,  no deformities.  NEURO: A&Ox3. Moving all extremities. 5/5 strength in upper and lower extremities bilaterally, no dysmetria, no pronator drift, cranial nerves 2-12 grossly intact, sensation intact to light touch bilaterally   SKIN: Warm and dry. No rash.  PSYCH: Normal affect.    Persistent vertigo with N/V awoken from sleep at 4AM, LKW 11PM. Non focal neurological exam with no nystagmus, unable to perform HINTs. Given persistent dizziness iso risk factors code stroke called for CT evaluation. 65 yo M with hx HTN presenting with vertigo a/w N/V after waking from sleep around 4AM. Unclear if woke him from sleep or he was awake and then it started. LKW 1130PM. No prior episodes. States not triggered by head movement and has been persistent. No headache, weakness, tingling, dysphagia, dysarthia, diplopia, chest pain, SOB, abdominal pain, recent illness, fever, urinary symptoms, diarrhea. Nonsmoker. NKDA.    Vitals nonactionable.     GENERAL: Appears nauseated, NAD  HEAD: NC/AT, neck supple, moist mucous membranes  EYES: PERRL, EOM grossly intact, sclera anicteric. No nystagmus.  LUNGS: normal WOB on RA, CTAB, no wheezes or crackles   CARDIAC: RRR, no m/r/g  ABDOMEN: Soft, non tender, non distended, no rebound, no guarding  EXT: No edema,  no deformities.  NEURO: A&Ox3. Moving all extremities. 5/5 strength in upper and lower extremities bilaterally, no dysmetria, no pronator drift, cranial nerves 2-12 grossly intact, sensation intact to light touch bilaterally   SKIN: Warm and dry. No rash.  PSYCH: Normal affect.    Persistent vertigo with N/V awoken from sleep at 4AM, LKW 11PM. Non focal neurological exam with no nystagmus, unable to perform HINTs. Given persistent dizziness iso risk factors code stroke called for CT evaluation.

## 2025-03-30 NOTE — ED ADULT NURSE REASSESSMENT NOTE - NS ED NURSE REASSESS COMMENT FT1
Received patient in CDU bed 9. Patient resting in bed, no distress noted. Patient denies any pain or discomfort at this time. Patient is A&OX4, ambulatory, airway patent, breathing unlabored and even, radial pulses palpable. Side rails up and safety maintained. Fall precaution in place. Call bells within reach. Received patient in CDU bed 9. Patient resting in bed, no distress noted. Patient denies any pain or discomfort at this time. Patient is on cardiac monitor sinus rhythm. Patient is A&OX4, ambulatory, airway patent, breathing unlabored and even, radial pulses palpable. Side rails up and safety maintained. Fall precaution in place. Call bells within reach.

## 2025-03-30 NOTE — ED CDU PROVIDER INITIAL DAY NOTE - CLINICAL SUMMARY MEDICAL DECISION MAKING FREE TEXT BOX
64-year-old Gurwinder-speaking male with PMHx, HTN, pre-DM who presented to the ED as code stroke for dizziness. Ugrwinder  Fay ID#[id number] assisted with translation. Patient went to bed on 3/29 at approximately 2300 in normal state of health. He woke up this morning around 0400 and immediately felt a severe room-spinning sensation, associated with nausea. Patient managed to walk to the bathroom where he continued to experience dry heaving. Returned to bed and lay down with minimal improvement of his symptoms, called his wife who brought him to the ED. Initial BP was 125/83, .   Upon neurology evaluation, patient reports that he continues to experience significant nausea and dry heaving. He endorses mild room spinning dizziness which is significantly improved since initial onset. He endorses slight worsening of his dizziness with sitting up from a supine position. He states that he is unable to walk at this time due to his nausea. Denies headache, blurry vision, weakness, numbness. Denies recent fever, chills, diarrhea, shortness of breath, tinnitus. Patient denies history of vertigo. He does not take antiplatelet/anticoagulating agents.   CDU PA: Agree with above.  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 CT perfusion no deficit.   Per neuro- likely peripheral in etiology given positional component, patient reports vertigo symptoms are improving but continues to experience nausea at this time. No other features to suggest central etiology.  Recommended MRI head if patient still symptomatic.  Sent to CDU for MRI, tele monitoring, echo.   Currently pt feeling better.

## 2025-03-30 NOTE — CONSULT NOTE ADULT - SUBJECTIVE AND OBJECTIVE BOX
**STROKE CODE CONSULT NOTE**    Last known well time/Time of onset of symptoms: 3/29 @2300    HPI: Lui Mars is a 64-year-old Gurwinder-speaking male with PMHx, HTN, pre-DM who presented to the ED as code stroke for dizziness. [facility]  Fay ID#[id number] assisted with translation. Patient went to bed on 3/29 at approximately 2300 in normal state of health. He woke up this morning around 0400 and immediately felt a severe room-spinning sensation, associated with nausea. Patient managed to walk to the bathroom where he continued to experience dry heaving. Returned to bed and lay down with minimal improvement of his symptoms, called his wife who brought him to the ED. Initial BP was 125/83, .     Upon neurology evaluation, patient reports that he continues to experience significant nausea and dry heaving. He endorses mild room spinning dizziness which is significantly improved since initial onset. He endorses slight worsening of his dizziness with sitting up from a supine position. He states that he is unable to walk at this time due to his nausea. Denies headache, blurry vision, weakness, numbness. Denies recent fever, chills, diarrhea, shortness of breath, tinnitus. Patient denies history of vertigo. He does not take antiplatelet/anticoagulating agents.     PAST MEDICAL & SURGICAL HISTORY:  Hypertension      No significant past surgical history      FAMILY HISTORY:  Family history of prostate cancer in father (Father)      SOCIAL HISTORY:  Smoking Cessation: Nonsmoker    ROS:  Constitutional: No fever, weight loss or fatigue  Eyes: No eye pain, visual disturbances, or discharge  ENMT:  +vertigo as above. No difficulty hearing, tinnitus; No sinus or throat pain  Neck: No pain or stiffness  Respiratory: No cough, wheezing, chills or hemoptysis  Cardiovascular: No chest pain, palpitations, shortness of breath, dizziness or leg swelling  Gastrointestinal: No abdominal pain. +nausea as above. No vomiting or hematemesis; No diarrhea or constipation. No hematochezia.  Genitourinary: No dysuria, frequency, hematuria or incontinence  Neurological: As per HPI  Skin: No itching, burning, rashes or lesions   Endocrine: No heat or cold intolerance; No hair loss  Musculoskeletal: No joint pain or swelling; No muscle, back or extremity pain  Psychiatric: No depression, anxiety, mood swings or difficulty sleeping  Heme/Lymph: No easy bruising or bleeding gums    MEDICATIONS  (STANDING):    MEDICATIONS  (PRN):      Allergies    No Known Allergies    Intolerances        Vital Signs Last 24 Hrs  T(C): 36.3 (30 Mar 2025 05:05), Max: 36.3 (30 Mar 2025 05:05)  T(F): 97.3 (30 Mar 2025 05:05), Max: 97.3 (30 Mar 2025 05:05)  HR: 64 (30 Mar 2025 05:05) (64 - 64)  BP: 125/83 (30 Mar 2025 05:05) (125/83 - 125/83)  BP(mean): --  RR: 16 (30 Mar 2025 05:05) (16 - 16)  SpO2: 100% (30 Mar 2025 05:05) (100% - 100%)    Parameters below as of 30 Mar 2025 05:05  Patient On (Oxygen Delivery Method): room air        PHYSICAL EXAM:  Constitutional: Uncomfortable appearing, holding emesis bag  Neurologic:  Mental status: Awake, alert and oriented x3.  Naming, repetition and comprehension intact.  No dysarthria.    Cranial nerves: Pupils equally round and reactive to light, visual fields full, no nystagmus, extraocular muscles intact, V1 through V3 intact bilaterally and symmetric, face symmetric, palate elevation symmetric, tongue was midline, shoulder shrug strength bilaterally 5/5.    Motor:  Normal bulk and tone, strength 5/5 in bilateral upper and lower extremities.   strength 5/5.     Sensation: Intact to light touch.  No neglect.   Coordination: No dysmetria on finger-to-nose and heel-to-shin.  No clumsiness.  Reflexes: 2+ in upper and lower extremities, plantars mute bilaterally  Gait: Patient refused due to nausea  NIHSS: 0    Fingerstick Blood Glucose: CAPILLARY BLOOD GLUCOSE      POCT Blood Glucose.: 137 mg/dL (30 Mar 2025 05:04)       LABS:                        13.5   8.85  )-----------( 406      ( 30 Mar 2025 05:59 )             39.6           PT/INR - ( 30 Mar 2025 05:59 )   PT: 12.5 sec;   INR: 1.05 ratio         PTT - ( 30 Mar 2025 05:59 )  PTT:26.6 sec          RADIOLOGY & ADDITIONAL STUDIES:    < from: CT Brain Stroke Protocol (03.30.25 @ 05:52) >  IMPRESSION:  No acute intracranial hemorrhage, mass effect, ormidline shift.    Periventricular and subcortical white matter hypodensities, consistent   with microvascular type changes.    < end of copied text >  < from: CT Angio Brain Stroke Protocol  w/ IV Cont (03.30.25 @ 06:12) >  IMPRESSION:    CT PERFUSION:  No convincing evidence of acute core infarct or ischemic penumbra. MRI   brain recommended if symptoms persist.    CTA NECK:  No evidence of significant stenosis or occlusion.    CTA HEAD:  No large vessel occlusion or hemodynamically flow-limiting stenosis   identified.    2 mm anteriorly projecting saccular aneurysm at the junction of the   anterior communicating artery and right A2 segment origin.    < end of copied text >     **STROKE CODE CONSULT NOTE**    Last known well time/Time of onset of symptoms: 3/29 @2300    HPI: Lui Mars is a 64-year-old Gurwinder-speaking male with PMHx, HTN, pre-DM who presented to the ED as code stroke for dizziness. [facility]  Fay ID#[id number] assisted with translation. Patient went to bed on 3/29 at approximately 2300 in normal state of health. He woke up this morning around 0400 and immediately felt a severe room-spinning sensation, associated with nausea. Patient managed to walk to the bathroom where he continued to experience dry heaving. Returned to bed and lay down with minimal improvement of his symptoms, called his wife who brought him to the ED. Initial BP was 125/83, .     Upon neurology evaluation, patient reports that he continues to experience significant nausea and dry heaving. He endorses mild room spinning dizziness which is significantly improved since initial onset. He endorses slight worsening of his dizziness with sitting up from a supine position. He states that he is unable to walk at this time due to his nausea. Denies headache, blurry vision, weakness, numbness. Denies recent fever, chills, diarrhea, shortness of breath, tinnitus. Patient denies history of vertigo. He does not take antiplatelet/anticoagulating agents.     PAST MEDICAL & SURGICAL HISTORY:  Hypertension      No significant past surgical history      FAMILY HISTORY:  Family history of prostate cancer in father (Father)      SOCIAL HISTORY:  Smoking Cessation: Nonsmoker    ROS:  Constitutional: No fever, weight loss or fatigue  Eyes: No eye pain, visual disturbances, or discharge  ENMT:  +vertigo as above. No difficulty hearing, tinnitus; No sinus or throat pain  Neck: No pain or stiffness  Respiratory: No cough, wheezing, chills or hemoptysis  Cardiovascular: No chest pain, palpitations, shortness of breath, dizziness or leg swelling  Gastrointestinal: No abdominal pain. +nausea as above. No vomiting or hematemesis; No diarrhea or constipation. No hematochezia.  Genitourinary: No dysuria, frequency, hematuria or incontinence  Neurological: As per HPI  Skin: No itching, burning, rashes or lesions   Endocrine: No heat or cold intolerance; No hair loss  Musculoskeletal: No joint pain or swelling; No muscle, back or extremity pain  Psychiatric: No depression, anxiety, mood swings or difficulty sleeping  Heme/Lymph: No easy bruising or bleeding gums    MEDICATIONS  (STANDING):    MEDICATIONS  (PRN):      Allergies    No Known Allergies    Intolerances        Vital Signs Last 24 Hrs  T(C): 36.3 (30 Mar 2025 05:05), Max: 36.3 (30 Mar 2025 05:05)  T(F): 97.3 (30 Mar 2025 05:05), Max: 97.3 (30 Mar 2025 05:05)  HR: 64 (30 Mar 2025 05:05) (64 - 64)  BP: 125/83 (30 Mar 2025 05:05) (125/83 - 125/83)  BP(mean): --  RR: 16 (30 Mar 2025 05:05) (16 - 16)  SpO2: 100% (30 Mar 2025 05:05) (100% - 100%)    Parameters below as of 30 Mar 2025 05:05  Patient On (Oxygen Delivery Method): room air        PHYSICAL EXAM:  Constitutional: Uncomfortable appearing, holding emesis bag  Neurologic:  Mental status: Awake, alert and oriented x3.  Naming, repetition and comprehension intact.  No dysarthria.    Cranial nerves: Pupils equally round and reactive to light, visual fields full, no nystagmus, extraocular muscles intact, V1 through V3 intact bilaterally and symmetric, face symmetric, palate elevation symmetric, tongue was midline, shoulder shrug strength bilaterally 5/5.    Motor:  Normal bulk and tone, strength 5/5 in bilateral upper and lower extremities.   strength 5/5.     Sensation: Intact to light touch.  No neglect.   Coordination: No dysmetria on finger-to-nose and heel-to-shin.  No clumsiness.  Reflexes: 2+ in upper and lower extremities, plantars mute bilaterally  HINTS: No nystagmus, no skew deviation, head impulse testing equivocal  Gait: Patient refused due to nausea  NIHSS: 0    Fingerstick Blood Glucose: CAPILLARY BLOOD GLUCOSE      POCT Blood Glucose.: 137 mg/dL (30 Mar 2025 05:04)       LABS:                        13.5   8.85  )-----------( 406      ( 30 Mar 2025 05:59 )             39.6           PT/INR - ( 30 Mar 2025 05:59 )   PT: 12.5 sec;   INR: 1.05 ratio         PTT - ( 30 Mar 2025 05:59 )  PTT:26.6 sec          RADIOLOGY & ADDITIONAL STUDIES:    < from: CT Brain Stroke Protocol (03.30.25 @ 05:52) >  IMPRESSION:  No acute intracranial hemorrhage, mass effect, ormidline shift.    Periventricular and subcortical white matter hypodensities, consistent   with microvascular type changes.    < end of copied text >  < from: CT Angio Brain Stroke Protocol  w/ IV Cont (03.30.25 @ 06:12) >  IMPRESSION:    CT PERFUSION:  No convincing evidence of acute core infarct or ischemic penumbra. MRI   brain recommended if symptoms persist.    CTA NECK:  No evidence of significant stenosis or occlusion.    CTA HEAD:  No large vessel occlusion or hemodynamically flow-limiting stenosis   identified.    2 mm anteriorly projecting saccular aneurysm at the junction of the   anterior communicating artery and right A2 segment origin.    < end of copied text >

## 2025-03-30 NOTE — ED ADULT NURSE REASSESSMENT NOTE - NS ED NURSE REASSESS COMMENT FT1
As per MD Monson, patient okay to eat breakfast. Pt endorsing he is vegetarian. Provided with meal tray. No difficulty swallowing noted. Pending MRI. Safety maintained.

## 2025-05-30 ENCOUNTER — APPOINTMENT (OUTPATIENT)
Dept: NEUROSURGERY | Facility: CLINIC | Age: 65
End: 2025-05-30

## 2025-06-03 ENCOUNTER — APPOINTMENT (OUTPATIENT)
Dept: NEUROSURGERY | Facility: CLINIC | Age: 65
End: 2025-06-03

## 2025-08-08 ENCOUNTER — TRANSCRIPTION ENCOUNTER (OUTPATIENT)
Age: 65
End: 2025-08-08